# Patient Record
Sex: MALE | Race: WHITE
[De-identification: names, ages, dates, MRNs, and addresses within clinical notes are randomized per-mention and may not be internally consistent; named-entity substitution may affect disease eponyms.]

---

## 2017-06-01 ENCOUNTER — HOSPITAL ENCOUNTER (EMERGENCY)
Dept: HOSPITAL 47 - EC | Age: 42
Discharge: HOME | End: 2017-06-01
Payer: COMMERCIAL

## 2017-06-01 VITALS
DIASTOLIC BLOOD PRESSURE: 82 MMHG | HEART RATE: 89 BPM | TEMPERATURE: 98.6 F | SYSTOLIC BLOOD PRESSURE: 159 MMHG | RESPIRATION RATE: 18 BRPM

## 2017-06-01 DIAGNOSIS — G89.29: ICD-10-CM

## 2017-06-01 DIAGNOSIS — M54.5: ICD-10-CM

## 2017-06-01 DIAGNOSIS — M54.16: Primary | ICD-10-CM

## 2017-06-01 PROCEDURE — 72131 CT LUMBAR SPINE W/O DYE: CPT

## 2017-06-01 PROCEDURE — 99283 EMERGENCY DEPT VISIT LOW MDM: CPT

## 2017-06-01 NOTE — ED
Back Pain HPI





- General


Chief Complaint: Back Pain/Injury


Stated Complaint: Back Pain


Time Seen by Provider: 06/01/17 16:30


Source: patient, RN notes reviewed


Mode of arrival: ambulatory


Limitations: no limitations





- History of Present Illness


Initial Comments: 





41-year-old male presents emergency Department chief complaint back pain.  

Patient states that said history of back surgery but states that today he 

started having severe left-sided or back pain that radiates down his leg.  

Patient had a fusion done by surgeon out of [place].  Patient states 

that he does not take any current pain meds.  Patient denies any abdominal pain 

including nausea vomiting diarrhea constipation.  Patient denies any bowel or 

bladder incontinence or retention.  Denies any trauma.  Patient states that 

there is points where he feels that he cannot move his left leg because of the 

pain.  Denies any discoloration of his leg denies any change in temperature





- Related Data


 Previous Rx's











 Medication  Instructions  Recorded


 


Hydrocodone/Acetaminophen [Norco 1 tab PO Q6HR PRN #15 tab 06/01/17





5-325]  


 


methylPREDNISolone [Medrol Dose 4 mg PO AS DIRECTED #1 pack 06/01/17





Pack]  











 Allergies











Allergy/AdvReac Type Severity Reaction Status Date / Time


 


No Known Allergies Allergy   Verified 06/01/17 15:56














Review of Systems


ROS Statement: 


Those systems with pertinent positive or pertinent negative responses have been 

documented in the HPI.





ROS Other: All systems not noted in ROS Statement are negative.





Past Medical History


Past Medical History: Hypertension


History of Any Multi-Drug Resistant Organisms: None Reported


Past Surgical History: Back Surgery


Past Psychological History: No Psychological Hx Reported


Smoking Status: Never smoker


Past Alcohol Use History: None Reported


Past Drug Use History: Marijuana





General Exam


Limitations: no limitations


General appearance: alert, in no apparent distress


Respiratory exam: Present: normal lung sounds bilaterally.  Absent: respiratory 

distress, wheezes, rales, rhonchi, stridor


Cardiovascular Exam: Present: regular rate, normal rhythm, normal heart sounds.

  Absent: systolic murmur, diastolic murmur, rubs, gallop, clicks


GI/Abdominal exam: Present: soft, normal bowel sounds.  Absent: distended, 

tenderness, guarding, rebound, rigid


Extremities exam: Present: normal inspection, full ROM, normal capillary refill

, other (Lower extremity strength equal bilaterally, neurovascular intact equal 

color equal warmth).  Absent: tenderness, pedal edema, joint swelling, calf 

tenderness


Back exam: Present: full ROM, tenderness (Mild tenderness over the left low 

back towards left SI joint), paraspinal tenderness, other (Pain with left 

straight leg raise).  Absent: CVA tenderness (R), CVA tenderness (L), muscle 

spasm, vertebral tenderness


Neurological exam: Present: alert, oriented X3, CN II-XII intact, reflexes 

normal.  Absent: motor sensory deficit


Skin exam: Present: warm, dry, intact, normal color.  Absent: rash





Course


 Vital Signs











  06/01/17





  15:53


 


Temperature 98.6 F


 


Pulse Rate 89


 


Respiratory 18





Rate 


 


Blood Pressure 159/82


 


O2 Sat by Pulse 98





Oximetry 














Medical Decision Making





- Medical Decision Making





41-year-old male presented for low back pain.  There is post surgical changes 

and osteophytic changes of the lumbar spine.  Patient has some lumbar 

radiculopathy symptoms at this time with no red flag symptoms.  Patient 

discharged with pain medication and follow-up with surgeon and primary care 

physician.  Return parameters were discussed.





Disposition


Clinical Impression: 


 Lumbar radiculopathy, Chronic back pain





Disposition: HOME SELF-CARE


Condition: Stable


Instructions:  Lumbar Radiculopathy (ED)


Additional Instructions: 


Please return to the Emergency Department if symptoms worsen or any other 

concerns.


Prescriptions: 


Hydrocodone/Acetaminophen [Norco 5-325] 1 tab PO Q6HR PRN #15 tab


 PRN Reason: Pain


methylPREDNISolone [Medrol Dose Pack] 4 mg PO AS DIRECTED #1 pack


Referrals: 


Isidoro Ha MD [Primary Care Provider] - 1-2 days


Time of Disposition: 18:15

## 2017-06-01 NOTE — CT
EXAMINATION TYPE: CT lumbar spine wo con

 

DATE OF EXAM: 6/1/2017 5:34 PM

 

COMPARISON: NONE

 

HISTORY: Lower back pain. Hx of fusion.

 

CT DLP: 881 mGycm

Automated exposure control for dose reduction was used.

 

Unenhanced CT of the lumbar spine was performed.  Bone and soft tissue window settings are submitted 
as well as coronal and sagittal reconstructions. 

 

There is a nonrotatory levoscoliotic curvature of the lumbosacral spine, which may be partially posit
ional in nature. Schmorl's nodes are noted at multiple vertebral endplates. No suspicious osseous les
ions.

 

L1-L2: Normal disc space height.  Broad-based disc bulge is seen No disc herniation protrusion or malcom
tral stenosis.  No facet joint arthropathy.  No evidence for foraminal encroachment.

 

L2-L3: There is a broad-based disc bulge resulting in mild bilateral neural foraminal narrowing No di
sc herniation protrusion or central stenosis. Mild facet arthropathy.

 

L3-L4: There is a broad-based disc bulge and ligamentum flavum hypertrophy in conjunction with facet 
arthropathy creating mild bilateral neural foraminal stenosis. No disc herniation protrusion or centr
al stenosis. Mild facet arthropathy.

 

L4-L5: There is a broad-based disc bulge and ligamentum flavum hypertrophy creating mild bilateral ne
ural foraminal narrowing. No disc herniation protrusion or central stenosis.  No facet joint arthropa
thy.  No evidence for foraminal encroachment.

 

L5-S1: Surgical fixation screw is seen of the right S1 pedicle as well as the pedicles of L5 and S1 o
n the left. Intervertebral disc age is noted at L5-S1 with surrounding vertebral body sclerosis. No f
acet joint arthropathy.  No evidence for foraminal encroachment.

 

IMPRESSION:

 

1. No evidence of fracture or dislocation.

2. Multilevel degenerative disc disease with associated osteoarthritic changes and postsurgical potter
es of L5-S1. Multilevel mild bilateral neural foraminal narrowing.

3. Mild levoconvex scoliotic curvature of the lumbar spine, which may be partially positional in natu
re.

## 2018-03-06 ENCOUNTER — HOSPITAL ENCOUNTER (OUTPATIENT)
Dept: HOSPITAL 47 - LABWHC1 | Age: 43
Discharge: HOME | End: 2018-03-06
Payer: COMMERCIAL

## 2018-03-06 DIAGNOSIS — Z00.00: Primary | ICD-10-CM

## 2018-03-06 LAB
ALBUMIN SERPL-MCNC: 4.4 G/DL (ref 3.5–5)
ALP SERPL-CCNC: 70 U/L (ref 38–126)
ALT SERPL-CCNC: 42 U/L (ref 21–72)
ANION GAP SERPL CALC-SCNC: 9 MMOL/L
AST SERPL-CCNC: 36 U/L (ref 17–59)
BASOPHILS # BLD AUTO: 0.1 K/UL (ref 0–0.2)
BASOPHILS NFR BLD AUTO: 1 %
BUN SERPL-SCNC: 19 MG/DL (ref 9–20)
CALCIUM SPEC-MCNC: 9.8 MG/DL (ref 8.4–10.2)
CHLORIDE SERPL-SCNC: 101 MMOL/L (ref 98–107)
CHOLEST SERPL-MCNC: 171 MG/DL (ref ?–200)
CO2 SERPL-SCNC: 31 MMOL/L (ref 22–30)
EOSINOPHIL # BLD AUTO: 0.5 K/UL (ref 0–0.7)
EOSINOPHIL NFR BLD AUTO: 6 %
ERYTHROCYTE [DISTWIDTH] IN BLOOD BY AUTOMATED COUNT: 4.93 M/UL (ref 4.3–5.9)
ERYTHROCYTE [DISTWIDTH] IN BLOOD: 13.4 % (ref 11.5–15.5)
GLUCOSE SERPL-MCNC: 94 MG/DL (ref 74–99)
HCT VFR BLD AUTO: 45.1 % (ref 39–53)
HDLC SERPL-MCNC: 52 MG/DL (ref 40–60)
HGB BLD-MCNC: 15.4 GM/DL (ref 13–17.5)
LDLC SERPL CALC-MCNC: 97 MG/DL (ref 0–99)
LYMPHOCYTES # SPEC AUTO: 1.9 K/UL (ref 1–4.8)
LYMPHOCYTES NFR SPEC AUTO: 23 %
MCH RBC QN AUTO: 31.3 PG (ref 25–35)
MCHC RBC AUTO-ENTMCNC: 34.3 G/DL (ref 31–37)
MCV RBC AUTO: 91.4 FL (ref 80–100)
MONOCYTES # BLD AUTO: 0.6 K/UL (ref 0–1)
MONOCYTES NFR BLD AUTO: 7 %
NEUTROPHILS # BLD AUTO: 5.1 K/UL (ref 1.3–7.7)
NEUTROPHILS NFR BLD AUTO: 60 %
PLATELET # BLD AUTO: 292 K/UL (ref 150–450)
POTASSIUM SERPL-SCNC: 4.9 MMOL/L (ref 3.5–5.1)
PROT SERPL-MCNC: 7.1 G/DL (ref 6.3–8.2)
SODIUM SERPL-SCNC: 141 MMOL/L (ref 137–145)
TRIGL SERPL-MCNC: 109 MG/DL (ref ?–150)
WBC # BLD AUTO: 8.4 K/UL (ref 3.8–10.6)

## 2018-03-06 PROCEDURE — 80061 LIPID PANEL: CPT

## 2018-03-06 PROCEDURE — 84443 ASSAY THYROID STIM HORMONE: CPT

## 2018-03-06 PROCEDURE — 36415 COLL VENOUS BLD VENIPUNCTURE: CPT

## 2018-03-06 PROCEDURE — 85025 COMPLETE CBC W/AUTO DIFF WBC: CPT

## 2018-03-06 PROCEDURE — 80053 COMPREHEN METABOLIC PANEL: CPT

## 2018-05-24 ENCOUNTER — HOSPITAL ENCOUNTER (OUTPATIENT)
Dept: HOSPITAL 47 - EC | Age: 43
Setting detail: OBSERVATION
LOS: 2 days | Discharge: HOME | End: 2018-05-26
Payer: COMMERCIAL

## 2018-05-24 VITALS — BODY MASS INDEX: 27 KG/M2

## 2018-05-24 DIAGNOSIS — S06.0X9A: Primary | ICD-10-CM
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
## 2018-05-24 DIAGNOSIS — M54.2: ICD-10-CM

## 2018-05-24 DIAGNOSIS — J30.2: ICD-10-CM

## 2018-05-24 DIAGNOSIS — S01.01XA: ICD-10-CM

## 2018-05-24 DIAGNOSIS — Z79.899: ICD-10-CM
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
## 2018-05-24 DIAGNOSIS — I10: ICD-10-CM

## 2018-05-24 DIAGNOSIS — F41.9: ICD-10-CM

## 2018-05-24 DIAGNOSIS — R55: ICD-10-CM

## 2018-05-24 LAB
ALBUMIN SERPL-MCNC: 3.9 G/DL (ref 3.5–5)
ALP SERPL-CCNC: 59 U/L (ref 38–126)
ALT SERPL-CCNC: 57 U/L (ref 21–72)
ANION GAP SERPL CALC-SCNC: 12 MMOL/L
AST SERPL-CCNC: 48 U/L (ref 17–59)
BASOPHILS # BLD AUTO: 0.1 K/UL (ref 0–0.2)
BASOPHILS NFR BLD AUTO: 1 %
BUN SERPL-SCNC: 25 MG/DL (ref 9–20)
CALCIUM SPEC-MCNC: 9.2 MG/DL (ref 8.4–10.2)
CHLORIDE SERPL-SCNC: 102 MMOL/L (ref 98–107)
CO2 SERPL-SCNC: 24 MMOL/L (ref 22–30)
EOSINOPHIL # BLD AUTO: 0.6 K/UL (ref 0–0.7)
EOSINOPHIL NFR BLD AUTO: 5 %
ERYTHROCYTE [DISTWIDTH] IN BLOOD BY AUTOMATED COUNT: 4.51 M/UL (ref 4.3–5.9)
ERYTHROCYTE [DISTWIDTH] IN BLOOD: 13.2 % (ref 11.5–15.5)
GLUCOSE SERPL-MCNC: 174 MG/DL (ref 74–99)
HCT VFR BLD AUTO: 40.9 % (ref 39–53)
HGB BLD-MCNC: 13.9 GM/DL (ref 13–17.5)
LYMPHOCYTES # SPEC AUTO: 3.2 K/UL (ref 1–4.8)
LYMPHOCYTES NFR SPEC AUTO: 24 %
MCH RBC QN AUTO: 30.8 PG (ref 25–35)
MCHC RBC AUTO-ENTMCNC: 33.9 G/DL (ref 31–37)
MCV RBC AUTO: 90.7 FL (ref 80–100)
MONOCYTES # BLD AUTO: 0.8 K/UL (ref 0–1)
MONOCYTES NFR BLD AUTO: 6 %
NEUTROPHILS # BLD AUTO: 8.5 K/UL (ref 1.3–7.7)
NEUTROPHILS NFR BLD AUTO: 64 %
PLATELET # BLD AUTO: 311 K/UL (ref 150–450)
POTASSIUM SERPL-SCNC: 3.9 MMOL/L (ref 3.5–5.1)
PROT SERPL-MCNC: 6.1 G/DL (ref 6.3–8.2)
SODIUM SERPL-SCNC: 138 MMOL/L (ref 137–145)
WBC # BLD AUTO: 13.4 K/UL (ref 3.8–10.6)

## 2018-05-24 PROCEDURE — 70450 CT HEAD/BRAIN W/O DYE: CPT

## 2018-05-24 PROCEDURE — 80053 COMPREHEN METABOLIC PANEL: CPT

## 2018-05-24 PROCEDURE — 72125 CT NECK SPINE W/O DYE: CPT

## 2018-05-24 PROCEDURE — 95819 EEG AWAKE AND ASLEEP: CPT

## 2018-05-24 PROCEDURE — 36415 COLL VENOUS BLD VENIPUNCTURE: CPT

## 2018-05-24 PROCEDURE — 12002 RPR S/N/AX/GEN/TRNK2.6-7.5CM: CPT

## 2018-05-24 PROCEDURE — 93005 ELECTROCARDIOGRAM TRACING: CPT

## 2018-05-24 PROCEDURE — 99285 EMERGENCY DEPT VISIT HI MDM: CPT

## 2018-05-24 PROCEDURE — 96372 THER/PROPH/DIAG INJ SC/IM: CPT

## 2018-05-24 PROCEDURE — 85025 COMPLETE CBC W/AUTO DIFF WBC: CPT

## 2018-05-24 PROCEDURE — 96360 HYDRATION IV INFUSION INIT: CPT

## 2018-05-24 NOTE — CT
EXAMINATION TYPE: CT brain bebeto treviño con

 

DATE OF EXAM: 5/24/2018

 

COMPARISON: NONE

 

HISTORY: trauma;evaluate for trauma

 

CT DLP: 1625.41 mGycm

Automated exposure control for dose reduction was used.

 

TECHNIQUE: CT scan of the head and cervical spine are performed without contrast.

 

FINDINGS:   Cervical vertebra have normal alignment. Posterior elements are intact. Disc spaces are f
airly normal. The skull base is intact. I see no bony destructive process. There is small anterior sp
urring at C5-6.

 

Ventricles and sulci appear normal. There is no mass effect nor midline shift. There is no sign of in
tracranial hemorrhage. There is fluid level in the right maxillary sinus. There is mucosal thickening
 in the ethmoid air cells. There is no evidence of a blowout fracture.

 

IMPRESSION:

Negative CT scan of the cervical spine.

 

Negative CT scan of the brain. Ethmoid and maxillary sinusitis.

## 2018-05-24 NOTE — ED
Head Injury HPI





- General


Chief complaint: Head Injury


Stated complaint: Fall, Head Injury


Time Seen by Provider: 05/24/18 22:34


Source: patient, EMS


Mode of arrival: EMS


Limitations: no limitations, language barrier





- History of Present Illness


Initial comments: 





This is a 42-year-old male with a history of hypertension or presents emergency 

department for a syncopal episode and head trauma.  The patient does not recall 

a lot of the events that led up to the syncopal episode however the wife states 

that the patient witnessed his daughter cutting herself in her room.  Shortly 

after he stumbled out and then became lightheaded and fell to the ground.  He 

struck his head on a piece of furniture.  An ambulance was called and the 

patient and his daughter were both brought to the emergency department.  The 

patient currently states he only has pain over the left forehead.  No nausea or 

vomiting.  No headache.  No neck pain.  No other injuries that he can report.  

He states he has no chest pain or shortness of breath.  He does have a history 

of syncope about 9 years ago where his blood pressure dropped.  No history 

since then.  He takes lisinopril daily.  He is not on any blood thinners.  No 

other acute complaints at this time.





- Related Data


 Home Medications











 Medication  Instructions  Recorded  Confirmed


 


Baclofen [Lioresal] 10 mg PO DAILY 05/24/18 05/24/18


 


Cetirizine HCl [Zyrtec] 10 mg PO DAILY PRN 05/24/18 05/24/18


 


Lisinopril 40 mg PO DAILY 05/24/18 05/24/18











Allergies/Adverse reactions: 


 Allergies











Allergy/AdvReac Type Severity Reaction Status Date / Time


 


No Known Allergies Allergy   Verified 05/24/18 23:17














Review of Systems


ROS Statement: 


Those systems with pertinent positive or pertinent negative responses have been 

documented in the HPI.





ROS Other: All systems not noted in ROS Statement are negative.





Past Medical History


Past Medical History: Hypertension


History of Any Multi-Drug Resistant Organisms: None Reported


Past Surgical History: Back Surgery


Past Psychological History: No Psychological Hx Reported


Smoking Status: Never smoker


Past Alcohol Use History: None Reported


Past Drug Use History: Marijuana





General Exam





- General Exam Comments


Initial Comments: 





Constitutional: Awake alert Appears comfortable


Head: Cephalic, there is a 57 year laceration to the left frontal scalp, 

bleeding is controlled, appears to be down to the subcutaneous tissue


Eyes: no conjunctival injection No scleral icterus EOMI, pupils are 4 mm 

reactive bilaterally


Neck: No JVD Supple


Heart: Regular rate rhythm normal S1-S2 no murmurs


Lungs: Clear to auscultation bilaterally No wheezing No rales


Abdomen: Soft nondistended nontender


Extremities: Non edematous DP pulses intact Radial pulses intact


Neuro: A&Ox3, the patient does have some memory deficits and repetitive 

questioning, cranial nerves II through XII are grossly intact, 5 out of 5 

strength in upper and lower extremities bilaterally No focal neurologic deficits


Psych: Appropriate mood and affect





Limitations: no limitations, language barrier





Course


 Vital Signs











  05/24/18





  22:39


 


Temperature 98.3 F


 


Pulse Rate 98


 


Respiratory 16





Rate 


 


Blood Pressure 155/80


 


O2 Sat by Pulse 98





Oximetry 














Procedures





- Laceration


  ** Laceration #1


Consent Obtained: verbal consent


Indication: laceration


Site: scalp


Size (cm): 5


Description: linear


Depth: simple, single layer


Anesthetic Used: lidocaine 1%


Anesthesia Technique: local infiltration


Amount (mls): 7


Pre-repair: wound explored, irrigated extensively, deep structures intact


Type of Sutures: nylon


Size of Sutures: 5-0


Number of Sutures: 8


Technique: simple, interrupted


Patient Tolerated Procedure: well, no complications





Medical Decision Making





- Medical Decision Making





Is a 42-year-old male who presents emergency Department after syncopal episode 

and head injury.  The patient appears to have a moderate traumatic brain 

injury.  He hasn't a lot of repetitive questioning and difficult to with 

memory.  He is awake and alert and interactive.  He is appropriate however just 

has significant memory problems.  Unfortunately the patient's wife is going to 

be in the emergency Department with her daughter for psychiatric clearance and 

thus she cannot monitor the patient at home.  Thus I feel the patient needs to 

stay in the hospital for close neurologic monitoring for any worsening.  I did 

speak with Dr. Jackson who accepted the patient for observation under his 

service.  He requested neuro checks and oriented were placed.  The patient was 

updated and agrees.  Laceration was repaired at bedside.





- Lab Data


Result diagrams: 


 05/24/18 22:51





 05/24/18 22:51


 Lab Results











  05/24/18 05/24/18 Range/Units





  22:51 22:51 


 


WBC  13.4 H   (3.8-10.6)  k/uL


 


RBC  4.51   (4.30-5.90)  m/uL


 


Hgb  13.9   (13.0-17.5)  gm/dL


 


Hct  40.9   (39.0-53.0)  %


 


MCV  90.7   (80.0-100.0)  fL


 


MCH  30.8   (25.0-35.0)  pg


 


MCHC  33.9   (31.0-37.0)  g/dL


 


RDW  13.2   (11.5-15.5)  %


 


Plt Count  311   (150-450)  k/uL


 


Neutrophils %  64   %


 


Lymphocytes %  24   %


 


Monocytes %  6   %


 


Eosinophils %  5   %


 


Basophils %  1   %


 


Neutrophils #  8.5 H   (1.3-7.7)  k/uL


 


Lymphocytes #  3.2   (1.0-4.8)  k/uL


 


Monocytes #  0.8   (0-1.0)  k/uL


 


Eosinophils #  0.6   (0-0.7)  k/uL


 


Basophils #  0.1   (0-0.2)  k/uL


 


Sodium   138  (137-145)  mmol/L


 


Potassium   3.9  (3.5-5.1)  mmol/L


 


Chloride   102  ()  mmol/L


 


Carbon Dioxide   24  (22-30)  mmol/L


 


Anion Gap   12  mmol/L


 


BUN   25 H  (9-20)  mg/dL


 


Creatinine   0.89  (0.66-1.25)  mg/dL


 


Est GFR (CKD-EPI)AfAm   >90  (>60 ml/min/1.73 sqM)  


 


Est GFR (CKD-EPI)NonAf   >90  (>60 ml/min/1.73 sqM)  


 


Glucose   174 H  (74-99)  mg/dL


 


Calcium   9.2  (8.4-10.2)  mg/dL


 


Total Bilirubin   0.3  (0.2-1.3)  mg/dL


 


AST   48  (17-59)  U/L


 


ALT   57  (21-72)  U/L


 


Alkaline Phosphatase   59  ()  U/L


 


Total Protein   6.1 L  (6.3-8.2)  g/dL


 


Albumin   3.9  (3.5-5.0)  g/dL














Disposition


Clinical Impression: 


 Closed head injury, Concussion with loss of consciousness





Disposition: ADMITTED AS IP TO THIS [facility]


Condition: Stable


Referrals: 


None,Stated [REFERRING] - 1-2 days

## 2018-05-25 VITALS — RESPIRATION RATE: 16 BRPM

## 2018-05-25 RX ADMIN — ACETAMINOPHEN PRN MG: 325 TABLET, FILM COATED ORAL at 17:40

## 2018-05-25 RX ADMIN — LISINOPRIL SCH MG: 20 TABLET ORAL at 11:53

## 2018-05-25 RX ADMIN — HEPARIN SODIUM SCH UNIT: 5000 INJECTION, SOLUTION INTRAVENOUS; SUBCUTANEOUS at 17:39

## 2018-05-25 RX ADMIN — HEPARIN SODIUM SCH UNIT: 5000 INJECTION, SOLUTION INTRAVENOUS; SUBCUTANEOUS at 23:43

## 2018-05-25 RX ADMIN — ACETAMINOPHEN PRN MG: 325 TABLET, FILM COATED ORAL at 09:31

## 2018-05-25 NOTE — EEG
ELECTROENCEPHALOGRAM REPORT



DATE OF EE2018.



REFERRING PHYSICIAN:

Dr. Yu.



ELECTROENCEPHALOGRAPHIC EXAMINATION REPORT:



INDICATION FOR EXAMINATION:

This patient is a 42-year-old male being evaluated for vasovagal syncope and closed

head injury.  Patient with episode of mild memory loss following head trauma.



[age]

[age].



EEG FINDINGS:

A routine 21 channel awake digital EEG recording was accomplished utilizing the 10-20

international system with bipolar and referential montages.  The background activity in

the most alert resting state consists of a low to medium amplitude, fairly well

developed and well sustained 8-9 hertz activity over the posterior head regions.  This

posterior rhythm attenuates to eye opening.  There is a small amount of low amplitude

18-20 Hz beta activity seen maximally over the anterior head regions.  Muscle and

movement artifact was observed on a few occasions during the tracing.



Hyperventilation was not performed.  Photic stimulation at flash frequencies of 2-30 Hz

produced a good symmetrical occipital driving response.  Toward the latter portion of

the tracing the patient does drift into spontaneous drowsiness.  No epileptiform

discharges were seen.



IMPRESSION:

This EEG is normal for the patient's age.  The EEG failed to reveal any focal,

lateralized, or epileptiform abnormalities.  Clinical correlation is recommended.



MMODL / IJN: [id number] / Job#: [id number]

## 2018-05-25 NOTE — CT
EXAMINATION TYPE: CT brain wo con

 

DATE OF EXAM: 5/25/2018

 

COMPARISON: 5/24/2018

 

HISTORY: Head injury with short term memory loss, laceration across Lt frontal area

 

CT DLP: 1017.9 mGycm.  Automated Exposure Control for Dose Reduction was Utilized.

 

 

TECHNIQUE: CT scan of the head is performed without contrast.

 

FINDINGS: There is a small left frontal scalp contusion without hematoma.  There is no acute intracra
nial hemorrhage, mass effect, or midline shift identified.  No suspicious extra-axial fluid collectio
n. The ventricles and sulci are within normal limits in size.  The globes are intact. There is mild t
o moderate mucosal thickening within the visualized sphenoid, ethmoid, and frontal sinuses. Mastoid a
ir cells are well aerated.

 

IMPRESSION: 

1. No acute intracranial process. Minimal left frontal scalp contusion without hematoma, intracranial
 hemorrhage, or calvarial fracture.

2. Mild to moderate pansinusitis.

## 2018-05-25 NOTE — P.CNNES
History of Present Illness


Consult date: 05/25/18


Reason for Consult: Patient with closed head injury and concussion.


History of Present Illness: 





This patient is a 42-year-old right-handed white male who was in his usual 

state of health until yesterday evening.  Patient states that about 9:30 PM 

yesterday evening he unfortunately walked into a situation that caused him to 

have severe anxiety.  Apparently his daughter was at home and was cutting 

herself in her room and when he had seen this he had a vasovagal episode and 

collapse.  The patient apparently was on the floor for several minutes before 

the daughter found him on the floor and was unresponsive.  She was able to call 

EMS.  By that time his wife had returned home from work and also noted that he 

seemed to be quite confused.  EMS was at the scene and brought him immediately 

to the emergency room at [facility] for further evaluation.  He 

was seen in the ER by Dr. Mckeon.  He was sent for a computed tomography scan of 

the brain as well as cervical spine.  CAT scan of the brain revealed no acute 

intracranial process.  There was a left frontal scalp contusion without 

hematoma.  There was no evidence of any intracranial hemorrhage or calvarial 

fracture.  There was mild to moderate sinusitis.  Computed tomography scan of 

the cervical spine was negative for any acute changes.  The patient suffered a 

closed head injury with his fall at home.  It is unclear exactly what happened 

but he feels he may have struck the edge of the table at home.  He sustained a 

large laceration to the left frontal area of the scalp.  This was sutured in 

the emergency room and he is doing better with this.  He did undergo the CAT 

scan in the ER yesterday which was reported negative for acute changes.  

Apparently this morning at about 4 AM he was having increasing confusion and 

amnesia.  This has subsequently shown significant improvement since 7 AM this 

morning.  He was sent for repeat computed tomography scan of the brain this 

morning which again came back negative for any acute changes.  The patient 

seems to be doing better overall according to his wife in terms of his amnesia.

  This is likely a mild form of retrograde amnesia following closed head 

injury.  It is unclear exactly how long he had loss of consciousness at home 

before the daughter found him.  He did not have any evidence of urinary or 

bladder incontinence at home.  He did not bite his tongue.  He has no previous 

history of seizures.  Patient apparently had this syncopal episode about 9 

years ago which was felt to be due to his antihypertensive medications 

producing hypotension.  Patient states he is working as an .  He is 

now feeling better this morning as compared to yesterday.  His amnesia seems to 

be improving with time.  We have recommended the patient to undergo routine EEG 

today for further evaluation following his closed head injury.  He is showing a 

normal neurological examination otherwise with no evidence of focal weakness or 

vestibular dysfunction following closed head injury.  We have gone over this 

signs to be monitored for this patient following his head concussion that would 

warrant him to be further reevaluated.  This was discussed in length with the 

patient and his wife at bedside today.  The patient otherwise seems to be 

making good progress.  He has been up and ambulating in his room without having 

any signs of gait imbalance.  He has a mild headache which is slowly improving 

as well with time.  We have recommended the patient should remain off of work 

for at least 1 week with slow recovery from his closed head injury.  Wife also 

is planning to keep him off of work for at least a week.  We have reviewed the 

[place] driving law with the patient which states he should not be driving for 

a period of 6 months following any syncopal episode and/or seizure.  As noted 

we will obtain a routine EEG today for further evaluation.  We have recommended 

patient to remain in observation overnight today.  We will review his EEG later 

today and if he is doing better tomorrow he may be considered for discharge 

home at that time.  Nursing staff will inform Dr. Yu regarding his overall 

condition and our recommendations.  We have explained to the wife and the 

patient that if his symptoms should worsen we would consider transferring him 

to a tertiary center for neurosurgical monitoring.  At this time his 

neurological examination is nonfocal.  He is showing signs of improvement in 

mental status as compared to yesterday.  He is not showing any worsening signs 

of amnesia at this time.  We will continue with close neuro checks.  We will 

plan to reevaluate him again tomorrow morning to review his test results and to 

see how he is doing clinically.  Neurology is now been consulted for further 

evaluation and recommendations.  





Review of Systems


Constitutional: Denies chills, Denies fever


Eyes: denies blurred vision, denies pain


Ears, nose, mouth and throat: Denies headache, Denies sore throat


Cardiovascular: Denies chest pain, Denies shortness of breath


Respiratory: Denies cough


Gastrointestinal: Denies abdominal pain, Denies diarrhea, Denies nausea, Denies 

vomiting


Musculoskeletal: Denies myalgias


Integumentary: Denies pruritus, Denies rash


Neurological: Reports confusion, Reports headaches, Reports memory loss, 

Reports syncope, Denies numbness, Denies weakness


Psychiatric: Denies anxiety, Denies depression


Endocrine: Denies fatigue, Denies weight change





Past Medical History


Past Medical History: Hypertension


History of Any Multi-Drug Resistant Organisms: None Reported


Past Surgical History: Back Surgery


Additional Past Surgical History / Comment(s): seasonal allergies


Past Anesthesia/Blood Transfusion Reactions: No Reported Reaction


Past Psychological History: No Psychological Hx Reported


Smoking Status: Never smoker


Past Alcohol Use History: None Reported


Past Drug Use History: Marijuana





Medications and Allergies


 Home Medications











 Medication  Instructions  Recorded  Confirmed  Type


 


Baclofen [Lioresal] 10 mg PO DAILY 05/24/18 05/24/18 History


 


Cetirizine HCl [Zyrtec] 10 mg PO DAILY PRN 05/24/18 05/24/18 History


 


Lisinopril 40 mg PO DAILY 05/24/18 05/24/18 History











 Allergies











Allergy/AdvReac Type Severity Reaction Status Date / Time


 


No Known Allergies Allergy   Verified 05/24/18 23:17














Physical Examination





- Vital Signs


Vital Signs: 


 Vital Signs











  Temp Pulse Pulse Resp BP BP Pulse Ox


 


 05/25/18 05:30  99.1 F   115 H  16   140/79  97


 


 05/25/18 03:00    109 H    


 


 05/25/18 02:00  99.1 F   109 H  16   145/86  97


 


 05/25/18 01:09  99.8 F H  109 H   18  161/74   96


 


 05/24/18 22:39  98.3 F  98   16  155/80   98








 Intake and Output











 05/24/18 05/25/18 05/25/18





 22:59 06:59 14:59


 


Intake Total   240


 


Balance   240


 


Intake:   


 


  Oral   240


 


Other:   


 


  Voiding Method  Toilet 





  Urinal 


 


  # Voids  8 


 


  Weight 97.522 kg 90.5 kg 











- Constitutional


General appearance: average body habitus, cooperative





- EENT


EENT: PERRL, mucous membranes moist





- Respiratory


Respiratory: lungs clear, normal breath sounds





- Cardiovascular


Cardiovascular: regular rate, normal S1, normal S2


Extremities: no peripheral edema bilaterally





- Gastrointestinal


Gastrointestinal: normoactive bowel sounds





- Integumentary


Integumentary: normal





- Neurologic


Cranial nerve examination: PERRL, EOMI, VFF, V1/V2/V3 grossly intact, face 

symmetric, tongue midline, intact gag reflex, intact corneal reflex, normal 

palatal elevation


Speech examination: intact


Sensorimotor examination: intact


Detailed motor examination: grossly full strength in all extremities


Motor examination - right side: 4/5: biceps, triceps, wrist flexion, wrist 

extension, , hip flexors, knee extensors, dorsiflexion, toe extension (EHL)

, plantarflexion


Motor examination - left side: 4/5: biceps, triceps, wrist flexion, wrist 

extension, , hip flexors, knee extensors, dorsiflexion, toe extension (EHL)

, plantarflexion


Detailed sensory examination: intact


Reflex and gait examination: intact


Reflexes: 1+: ankle, bicep, knee, tricep





- Musculoskeletal


Musculoskeletal: no pain





- Psychiatric


Psychiatric: mood/affect appropriate, cooperative





Results





- Laboratory Findings


CBC and BMP: 


 05/24/18 22:51





 05/24/18 22:51


Abnormal Lab Findings: 


 Abnormal Labs











  05/24/18 05/24/18





  22:51 22:51


 


WBC  13.4 H 


 


Neutrophils #  8.5 H 


 


BUN   25 H


 


Glucose   174 H


 


Total Protein   6.1 L














Assessment and Plan


(1) Closed head injury


Current Visit: Yes   Status: Acute   Code(s): S09.90XA - UNSPECIFIED INJURY OF 

HEAD, INITIAL ENCOUNTER   SNOMED Code(s): 138991391929


   





(2) Vasovagal syncope


Current Visit: Yes   Status: Acute   Code(s): R55 - SYNCOPE AND COLLAPSE   

SNOMED Code(s): 117714226


   





(3) Concussion with loss of consciousness


Current Visit: Yes   Status: Acute   Code(s): S06.0X9A - CONCUSSION W LOSS OF 

CONSCIOUSNESS OF UNSP DURATION, INIT   SNOMED Code(s): 54604521


   





(4) Headache


Current Visit: Yes   Status: Acute   Code(s): R51 - HEADACHE   SNOMED Code(s): 

99021766


   


Plan: 





This patient is a 42-year-old male who had a vasovagal episode yesterday 

evening at home.  He had a passing out episode and struck the left side of his 

4 had due to the fall.  Apparently he had witnessed his daughter cutting 

herself with apparent suicidal attempt.  Seen this he had passed out and likely 

had a vasovagal event.  As per the wife the patient likely was unresponsive and 

unconscious for at least 5 minutes or so.  When she arrived home he seemed to 

be slowly coming out of his passing out spell.  He sustained a laceration to 

the left for head and his daughter was able to find him and called EMS.  

Patient was brought into the emergency room yesterday evening for further 

evaluation and was seen in the ER by Dr. Mckeon.  Computed tomography scan of the 

brain and cervical spine was completed the results of which are noted above.  

He had evidence of closed head injury with loss of consciousness and vasovagal 

syncope.  His initial CAT scan yesterday was reported negative.  He had some 

change early this morning with increasing and amnesia which has subsequently 

resolved.  He had a repeat computed tomography scan this morning which once 

again failed to reveal any acute changes.  This patient is suffering acute 

closed head injury with probable head concussion.  Cause likely vasovagal in 

etiology as noted above.  Since the patient did have some loss of consciousness 

he was advised of the [place] driving law which states he should not drive for 

period of 6 months following any syncope and/or seizure.  As noted his syncopal 

episode likely vasovagal in nature.  We have recommended the patient undergo 

routine EEG this afternoon for further evaluation.  His neurological 

examination at this time is otherwise nonfocal.  We will monitor him for 

another 24 hours to make sure he is not showing any worsening symptoms of 

amnesia or worsening headache.  Hopefully if his examination tomorrow morning 

is normally will be considered for discharge home.  We have recommended to the 

patient and his wife that he should remain off of work for at least 1 week with 

slow increase in activity following his head concussion and closed head injury.

  He may follow-up in the outpatient neurology clinic in 1-2 weeks as well.  We 

will continue close neurological follow-up for the patient during this 

admission.  We will review the results of the EEG with the patient tomorrow.  

As noted if he continues to show improvement he will likely be able to be 

discharged home tomorrow morning.  His overall prognosis at this time remains 

guarded.


Time with Patient: Greater than 30

## 2018-05-26 VITALS — TEMPERATURE: 98.4 F | DIASTOLIC BLOOD PRESSURE: 76 MMHG | HEART RATE: 75 BPM | SYSTOLIC BLOOD PRESSURE: 115 MMHG

## 2018-05-26 RX ADMIN — LISINOPRIL SCH MG: 20 TABLET ORAL at 08:37

## 2018-05-26 RX ADMIN — HEPARIN SODIUM SCH UNIT: 5000 INJECTION, SOLUTION INTRAVENOUS; SUBCUTANEOUS at 08:37

## 2018-05-26 NOTE — P.PN
Progress Note - Text


Progress Note Date: 05/26/18





The patient remained stable.  There is no acute changes.  He has really no 

complaints.





On exam his vital signs are stable.  Abdomen soft.





Patient will be discharged home.  He'll follow-up Dr. Yu.

## 2018-06-21 ENCOUNTER — HOSPITAL ENCOUNTER (OUTPATIENT)
Dept: HOSPITAL 47 - RADUSWWP | Age: 43
Discharge: HOME | End: 2018-06-21
Payer: COMMERCIAL

## 2018-06-21 DIAGNOSIS — R55: Primary | ICD-10-CM

## 2018-06-21 DIAGNOSIS — Z87.820: ICD-10-CM

## 2018-06-21 PROCEDURE — 93880 EXTRACRANIAL BILAT STUDY: CPT

## 2018-06-21 NOTE — US
EXAMINATION TYPE: US carotid duplex BILAT

 

DATE OF EXAM: 6/21/2018

 

COMPARISON: NONE

 

CLINICAL HISTORY: R55 syncope, Z87.820 Hx closed head injury. Closed head injury, memory loss

 

EXAM MEASUREMENTS: 

 

RIGHT:  Peak Systolic Velocity (PSV) cm/sec

----- Right CCA:  113.4  

----- Right ICA:  122.4     

----- Right ECA:  135.0   

ICA/CCA ratio:  1.1    

 

RIGHT:  End Diastole cm/sec

----- Right CCA:  42.2   

----- Right ICA:  25.4      

----- Right ECA:  31.7     

 

LEFT:  Peak Systolic Velocity (PSV) cm/sec

----- Left CCA:  123.7  

----- Left ICA:  96.3   

----- Left ECA:  156.8  

ICA/CCA ratio:  0.8  

 

LEFT:  End Diastole cm/sec

----- Left CCA:  33.1  

----- Left ICA:  42.4   

----- Left ECA:  33.3 

 

VERTEBRALS (direction of flow):

Right Vertebral: Antegrade

Left Vertebral: Antegrade

 

Rhythm:  Normal

 

No significant stenosis seen, **Incidental finding of 2 large right thyroid nodules**

 

 

 

IMPRESSION:  There is antegrade flow in the vertebral arteries. The images and measurements suggest c
lose to 0% stenosis in both internal carotid arteries.   

 

 

Criteria for Assigning % of Stenosis / Diameter reduction

(Estimation based on the indirect measurements of the internal carotid artery velocities (ICA PSV).

1.  Normal (no stenosis)=ICA PSV < 125 cm/s: ratio < 2.0: ICA EDV<40 cm/s.

2. Less than 50% stenosis=ICA PSV < 125 cm/s: ratio < 2.0: ICA EDV<40 cm/s.

3.  50 to 69% stenosis=ICA PSV of 125 to 230 cm/s: ration 2.0 ? 4.0: ICA EDV  cm/s.

4.  Greater than 70% stenosis to near occlusion= ICA PSV > 230 cm/s: ratio > 4.0: ICA EDV > 100 cm/s.
 

5.  Near occlusion= ICA PSV velocities may be low or undetectable: variable ratio and ICA EDV.

6.  Total occlusion=unable to detect flow.

## 2018-07-06 ENCOUNTER — HOSPITAL ENCOUNTER (OUTPATIENT)
Dept: HOSPITAL 47 - RADUSWWP | Age: 43
Discharge: HOME | End: 2018-07-06
Payer: COMMERCIAL

## 2018-07-06 DIAGNOSIS — E04.2: Primary | ICD-10-CM

## 2018-07-06 PROCEDURE — 76536 US EXAM OF HEAD AND NECK: CPT

## 2018-07-07 NOTE — US
EXAMINATION TYPE: US thyroid st tissue head/neck

 

DATE OF EXAM: 7/6/2018

 

COMPARISON: Carotid ultrasound dated 6/21/2018

 

CLINICAL HISTORY: E04.1 Thyroid Nodule.

 

GLAND SIZE:

 

Right Lobe: 7.6 x 3.2 x 3.3cm

** Overall Parenchyma:  heterogenous

Left Lobe: 3.8 x1.3 x 1.1 cm

** Overall Parenchyma:  homogeneous

Isthmus Thickness:  0.2 cm

 

NODULES

 

RIGHT:   # of nodules measured on right: 2

1.  4.2  X  2.6 x 2.8 cm isoechoic mixed nodule at the upper pole with well-defined margins.  This no
dule is taller than wide and shows intranodular vascularity.

No prior

2. 3.3 x 2.8 x 2.8cm isoechoic mixed nodule at the lower pole with well-defined margins .  This nodul
e is wider than tall and shows intranodular vascularity.

 

 

LEFT:    # of nodules measured on left:  0

 

 

ISTHMUS:    # of nodules measured in the isthmus:  1

1.  3.2 X 1.8  x 2.9 cm isoechoic solid nodule at the rt pole with well-defined margins.  This nodule
 is wider than tall and shows intranodular vascularity.

 

 

Bilateral neck scanned, no evidence of lymphadenopathy.

 

 

IMPRESSION:

Multiple large complex thyroid nodules for which fine-needle aspiration is recommended. These appear 
similar in morphology, therefore biopsy of the largest mass could be performed with recommendation of
 the remaining masses made on biopsy results.

## 2020-01-20 ENCOUNTER — HOSPITAL ENCOUNTER (OUTPATIENT)
Dept: HOSPITAL 47 - RADUSWWP | Age: 45
Discharge: HOME | End: 2020-01-20
Attending: FAMILY MEDICINE
Payer: COMMERCIAL

## 2020-01-20 DIAGNOSIS — E04.9: Primary | ICD-10-CM

## 2020-01-20 PROCEDURE — 76536 US EXAM OF HEAD AND NECK: CPT

## 2020-01-20 NOTE — US
EXAMINATION TYPE: US thyroid st tissue head/neck

 

DATE OF EXAM: 1/20/2020

 

COMPARISON: US

 

CLINICAL HISTORY: E04.9 enlarged thyroid. Enlarged right thyroid, F/U

 

GLAND SIZE:

Right Lobe: 8.6 x 4.8 x 3.7 cm

** Overall Parenchyma:  heterogenous

Left Lobe: 3.8 x 1.3 x 1.0 cm

** Overall Parenchyma:  heterogeneous

Isthmus Thickness:  0.2 cm

 

NODULES

 

RIGHT:   # of nodules measured on right: 2

1.  5.1 X 3.5  x 3.7 cm isoechoic mixed nodule at the upper pole with well-defined margins;  This nod
ule is wider than tall and shows intranodular vascularity.

** Prior size: 4.2 x 2.6  x 2.8 cm 

2. 3.2 X 3.2  x 3.3 cm isoechoic solid nodule at the lower pole with well-defined margins;  This nodu
le is wider than tall and shows intranodular vascularity.

 ** Prior size:  3.3 x 2.8  x 2.8 cm 

 

 

ISTHMUS:    # of nodules measured in the isthmus:  1

1.  3.3 X 1.8  x 3.2 cm isoechoic solid nodule with well-defined margins;  This nodule is wider than 
tall and shows intranodular vascularity.

** Prior size:  3.2 x 1.8  x 2.9 cm 

 

Bilateral neck scanned, no evidence of lymphadenopathy. All nodules larger in size when compared to p
revious.

 

 

 

 

 

IMPRESSION:

Enlarging nonspecific thyroid nodularity is noted above

## 2021-04-16 ENCOUNTER — HOSPITAL ENCOUNTER (OUTPATIENT)
Dept: HOSPITAL 47 - LABWHC1 | Age: 46
Discharge: HOME | End: 2021-04-16
Attending: INTERNAL MEDICINE
Payer: COMMERCIAL

## 2021-04-16 DIAGNOSIS — E03.9: Primary | ICD-10-CM

## 2021-04-16 PROCEDURE — 84443 ASSAY THYROID STIM HORMONE: CPT

## 2021-04-16 PROCEDURE — 84439 ASSAY OF FREE THYROXINE: CPT

## 2021-04-16 PROCEDURE — 36415 COLL VENOUS BLD VENIPUNCTURE: CPT

## 2021-04-17 LAB — T4 FREE SERPL-MCNC: 1.6 NG/DL (ref 0.8–1.8)

## 2025-04-23 NOTE — P.GSHP
History of Present Illness


H&P Date: 05/25/18


Chief Complaint: Scalp laceration, concussion





Patient came to the ER yesterday after a vasovagal event at home where he fell 

and struck his forehead on the dining room table.  He developed a laceration to 

the left frontal scalp.  He was amnestic to the event.  Possible loss of 

consciousness.  No headache.  No localizing neurologic symptoms.  I was 

contacted stating that the patient would have been discharged home however 

there was nobody at home to observe the patient for progression of his 

concussion.  I was asked to observe this patient overnight which we agreed to 

do.  I was contacted by the nursing staff around 4 AM.  The patient's 

concussion symptoms had progressed somewhat.  The patient developed short-term 

memory loss forgetting everything greater than 10 seconds in the past.  Since 

this morning around 7 the patient's memory has improved.  The family is happy 

with his improvement this morning.  He recalls going for a CAT scan about 2-3 

hours ago.  No headache currently.  No visual disturbances.  No weakness or 

numbness in the extremities.  Denies abdominal pain or chest pain.  Had mild 

neck pain.  CT neck was normal.





- Review of Systems


Comment: 





The patient denies any acute changes in vision or hearing, no dysphagia or 

odynophagia, no chest pain or shortness of breath, no dysuria or hematuria, no 

headache, no runny nose, no rectal bleeding or melena, no unexplained weight 

loss 





Past Medical History


Past Medical History: Hypertension


History of Any Multi-Drug Resistant Organisms: None Reported


Past Surgical History: Back Surgery


Additional Past Surgical History / Comment(s): seasonal allergies


Past Anesthesia/Blood Transfusion Reactions: No Reported Reaction


Past Psychological History: No Psychological Hx Reported


Smoking Status: Never smoker


Past Alcohol Use History: None Reported


Past Drug Use History: Marijuana





Medications and Allergies


 Home Medications











 Medication  Instructions  Recorded  Confirmed  Type


 


Baclofen [Lioresal] 10 mg PO DAILY 05/24/18 05/24/18 History


 


Cetirizine HCl [Zyrtec] 10 mg PO DAILY PRN 05/24/18 05/24/18 History


 


Lisinopril 40 mg PO DAILY 05/24/18 05/24/18 History











 Allergies











Allergy/AdvReac Type Severity Reaction Status Date / Time


 


No Known Allergies Allergy   Verified 05/24/18 23:17














Surgical - Exam


 Vital Signs











Temp Pulse Resp BP Pulse Ox


 


 98.3 F   98   16   155/80   98 


 


 05/24/18 22:39  05/24/18 22:39  05/24/18 22:39  05/24/18 22:39  05/24/18 22:39

















Physical exam:


General: Well-developed, well-nourished


HEENT: Laceration left frontal scalp 8 cm, PERRLA, sclerae nonicteric


Abdomen: Nontender, nondistended


Extremities: No edema no motor or sensory weakness


Neuro: Alert and oriented





Results





- Labs





 05/24/18 22:51





 05/24/18 22:51


 Abnormal Lab Results - Last 24 Hours (Table)











  05/24/18 05/24/18 Range/Units





  22:51 22:51 


 


WBC  13.4 H   (3.8-10.6)  k/uL


 


Neutrophils #  8.5 H   (1.3-7.7)  k/uL


 


BUN   25 H  (9-20)  mg/dL


 


Glucose   174 H  (74-99)  mg/dL


 


Total Protein   6.1 L  (6.3-8.2)  g/dL








 Diabetes panel











  05/24/18 Range/Units





  22:51 


 


Sodium  138  (137-145)  mmol/L


 


Potassium  3.9  (3.5-5.1)  mmol/L


 


Chloride  102  ()  mmol/L


 


Carbon Dioxide  24  (22-30)  mmol/L


 


BUN  25 H  (9-20)  mg/dL


 


Creatinine  0.89  (0.66-1.25)  mg/dL


 


Glucose  174 H  (74-99)  mg/dL


 


Calcium  9.2  (8.4-10.2)  mg/dL


 


AST  48  (17-59)  U/L


 


ALT  57  (21-72)  U/L


 


Alkaline Phosphatase  59  ()  U/L


 


Total Protein  6.1 L  (6.3-8.2)  g/dL


 


Albumin  3.9  (3.5-5.0)  g/dL








 Calcium panel











  05/24/18 Range/Units





  22:51 


 


Calcium  9.2  (8.4-10.2)  mg/dL


 


Albumin  3.9  (3.5-5.0)  g/dL








 Pituitary panel











  05/24/18 Range/Units





  22:51 


 


Sodium  138  (137-145)  mmol/L


 


Potassium  3.9  (3.5-5.1)  mmol/L


 


Chloride  102  ()  mmol/L


 


Carbon Dioxide  24  (22-30)  mmol/L


 


BUN  25 H  (9-20)  mg/dL


 


Creatinine  0.89  (0.66-1.25)  mg/dL


 


Glucose  174 H  (74-99)  mg/dL


 


Calcium  9.2  (8.4-10.2)  mg/dL








 Adrenal panel











  05/24/18 Range/Units





  22:51 


 


Sodium  138  (137-145)  mmol/L


 


Potassium  3.9  (3.5-5.1)  mmol/L


 


Chloride  102  ()  mmol/L


 


Carbon Dioxide  24  (22-30)  mmol/L


 


BUN  25 H  (9-20)  mg/dL


 


Creatinine  0.89  (0.66-1.25)  mg/dL


 


Glucose  174 H  (74-99)  mg/dL


 


Calcium  9.2  (8.4-10.2)  mg/dL


 


Total Bilirubin  0.3  (0.2-1.3)  mg/dL


 


AST  48  (17-59)  U/L


 


ALT  57  (21-72)  U/L


 


Alkaline Phosphatase  59  ()  U/L


 


Total Protein  6.1 L  (6.3-8.2)  g/dL


 


Albumin  3.9  (3.5-5.0)  g/dL














Assessment and Plan


(1) Concussion with loss of consciousness


Narrative/Plan: 


Patient had a repeat CAT scan performed this morning given his progression of 

amnesia overnight.  Repeat CAT scan showed no intracranial abnormalities.  His 

symptoms are improved currently.  We'll consult neurology today.  We'll plan 

probable discharge later today if agreeable with neurology.  We did discuss the 

options of a tertiary care transfer for neurosurgical evaluation.  The patient 

wouldn't like to avoid that if at all possible.


Current Visit: Yes   Status: Acute   Code(s): S06.0X9A - CONCUSSION W LOSS OF 

CONSCIOUSNESS OF UNSP DURATION, INIT   SNOMED Code(s): 45467757
Risks/benefits discussed with patient/surrogate/Vaccine Information Sheet (VIS) provided-VIS date: 8/6/21